# Patient Record
Sex: MALE | Race: WHITE | NOT HISPANIC OR LATINO | Employment: FULL TIME | URBAN - METROPOLITAN AREA
[De-identification: names, ages, dates, MRNs, and addresses within clinical notes are randomized per-mention and may not be internally consistent; named-entity substitution may affect disease eponyms.]

---

## 2017-01-25 ENCOUNTER — OFFICE VISIT (OUTPATIENT)
Dept: FAMILY MEDICINE | Facility: CLINIC | Age: 27
End: 2017-01-25
Payer: COMMERCIAL

## 2017-01-25 VITALS
TEMPERATURE: 96.9 F | HEIGHT: 69 IN | SYSTOLIC BLOOD PRESSURE: 126 MMHG | BODY MASS INDEX: 40.88 KG/M2 | HEART RATE: 80 BPM | WEIGHT: 276 LBS | OXYGEN SATURATION: 98 % | DIASTOLIC BLOOD PRESSURE: 86 MMHG

## 2017-01-25 DIAGNOSIS — Z72.0 TOBACCO ABUSE: ICD-10-CM

## 2017-01-25 DIAGNOSIS — J04.0 LARYNGITIS: Primary | ICD-10-CM

## 2017-01-25 PROCEDURE — 99213 OFFICE O/P EST LOW 20 MIN: CPT | Performed by: NURSE PRACTITIONER

## 2017-01-25 RX ORDER — PREDNISONE 20 MG/1
60 TABLET ORAL DAILY
Qty: 15 TABLET | Refills: 0 | Status: SHIPPED | OUTPATIENT
Start: 2017-01-25 | End: 2018-04-30

## 2017-01-25 NOTE — MR AVS SNAPSHOT
"              After Visit Summary   2017    Rene Scruggs    MRN: [id number]           Patient Information     Date Of Birth          1990        Visit Information        Provider Department      2017 3:00 PM Jessica Ambrocio APRN CNP [facility]        Today's Diagnoses     Laryngitis    -  1        Follow-ups after your visit        Who to contact     If you have questions or need follow up information about today's clinic visit or your schedule please contact [facility] directly at [phone].  Normal or non-critical lab and imaging results will be communicated to you by [employer], letter or phone within 4 business days after the clinic has received the results. If you do not hear from us within 7 days, please contact the clinic through [employer] or phone. If you have a critical or abnormal lab result, we will notify you by phone as soon as possible.  Submit refill requests through [employer] or call your pharmacy and they will forward the refill request to us. Please allow 3 business days for your refill to be completed.          Additional Information About Your Visit        MyChart Information     Net[employer]es lets you send messages to your doctor, view your test results, renew your prescriptions, schedule appointments and more. To sign up, go to [email or web address] . Click on \"Log in\" on the left side of the screen, which will take you to the Welcome page. Then click on \"Sign up Now\" on the right side of the page.     You will be asked to enter the access code listed below, as well as some personal information. Please follow the directions to create your username and password.     Your access code is: [email or web address]  Expires: 2017  3:29 PM     Your access code will  in 90 days. If you need help or a new code, please call your [facility] or [phone].        Care EveryWhere ID     This is your Care EveryWhere ID. This could be used by other " "organizations to access your [facility] medical records  [id number]        Your Vitals Were     Pulse Temperature Height BMI (Body Mass Index) Pulse Oximetry       80 96.9  F (36.1  C) (Tympanic) 5' 8.8\" (1.748 m) 40.97 kg/m2 98%        Blood Pressure from Last 3 Encounters:   01/25/17 126/86   02/11/16 124/100   01/29/16 168/98    Weight from Last 3 Encounters:   01/25/17 276 lb (125.193 kg)   02/11/16 249 lb 1.6 oz (112.991 kg)   01/29/16 259 lb 1.6 oz (117.527 kg)              Today, you had the following     No orders found for display         Today's Medication Changes          These changes are accurate as of: 1/25/17  3:29 PM.  If you have any questions, ask your nurse or doctor.               Start taking these medicines.        Dose/Directions    predniSONE 20 MG tablet   Commonly known as:  DELTASONE   Used for:  Laryngitis   Started by:  Jessica Ambrocio APRN CNP        Dose:  60 mg   Take 3 tablets (60 mg) by mouth daily   Quantity:  15 tablet   Refills:  0            Where to get your medicines      These medications were sent to [facility] [place], [place] - [address]   [address] , [facility] [address]     Phone:  [phone]    - predniSONE 20 MG tablet             Primary Care Provider Office Phone # Fax #    Schuyler Gresham [phone]       [facility] [address] [facility] [address]        Thank you!     Thank you for choosing [facility]  for your care. Our goal is always to provide you with excellent care. Hearing back from our patients is one way we can continue to improve our services. Please take a few minutes to complete the written survey that you may receive in the mail after your visit with us. Thank you!             Your Updated Medication List - Protect others around you: Learn how to safely use, store and throw away your medicines at www.disposemymeds.org.          This list is accurate as of: 1/25/17  3:29 PM.  " Always use your most recent med list.                   Brand Name Dispense Instructions for use    predniSONE 20 MG tablet    DELTASONE    15 tablet    Take 3 tablets (60 mg) by mouth daily

## 2017-01-25 NOTE — PROGRESS NOTES
"  SUBJECTIVE:                                                    Rene Scruggs is a 26 year old male who presents to clinic today for the following health issues:      Concern - hoarseness     Onset: couple months     Description:   Hoarseness in voice    Intensity: mild    Progression of Symptoms:  same    Accompanying Signs & Symptoms:  none       Previous history of similar problem:   none    Precipitating factors:   Worsened by: speaking loudly at work, over the noise of machines    Alleviating factors:  Improved by: none       Therapies Tried and outcome: none      The patient is a 26-year-old male seen in clinic today because of a hoarse voice. This has persisted for 1 or 2 months.  He thinks it has gotten a little bit better. 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 He states he has to talk over the noise of the machines. He also is a cigarette smoker.  He denies throat pain  He denies significant cough.    Problem list and histories reviewed & adjusted, as indicated.  Additional history: as documented    BP Readings from Last 3 Encounters:   01/25/17 126/86   02/11/16 124/100   01/29/16 168/98    Wt Readings from Last 3 Encounters:   01/25/17 276 lb (125.193 kg)   02/11/16 249 lb 1.6 oz (112.991 kg)   01/29/16 259 lb 1.6 oz (117.527 kg)                    ROS:  Constitutional, HEENT, cardiovascular, pulmonary, gi and gu systems are negative, except as otherwise noted.    OBJECTIVE:                                                    /86 mmHg  Pulse 80  Temp(Src) 96.9  F (36.1  C) (Tympanic)  Ht 5' 8.8\" (1.748 m)  Wt 276 lb (125.193 kg)  BMI 40.97 kg/m2  SpO2 98%  Body mass index is 40.97 kg/(m^2).  GENERAL: healthy, alert and no distress  EYES: Eyes grossly normal to inspection, PERRL and conjunctivae and sclerae normal  HENT: ear canals and TM's normal, nose and mouth without ulcers or lesions  NECK: no adenopathy, no asymmetry, masses, or scars and thyroid " normal to palpation  RESP: lungs clear to auscultation - no rales, rhonchi or wheezes  CV: regular rates and rhythm, normal S1 S2, no S3 or S4 and no murmur, click or rub         ASSESSMENT/PLAN:                                                      Problem List Items Addressed This Visit     None      Visit Diagnoses     Laryngitis    -  Primary     Relevant Medications     predniSONE (DELTASONE) 20 MG tablet     Tobacco abuse         Relevant Medications     nicotine polacrilex (EQ NICOTINE) 4 MG gum          Discussed the viral nature of laryngitis. Fact that voice rest is what is more most beneficial. There is some concern regarding his cigarette smoking history  Prednisone 60 mg daily for 5 days  Avoid talking as much as possible  Increase humidity in the home  Avoid smoking  Follow up in clinic in 2 weeks. If no improvement is noted, may consider referral to ENT for further evaluation    RAMSES Dyson [facility]

## 2017-01-25 NOTE — NURSING NOTE
"Chief Complaint   Patient presents with     URI       Initial /86 mmHg  Pulse 80  Temp(Src) 96.9  F (36.1  C) (Tympanic)  Ht 5' 8.8\" (1.748 m)  Wt 276 lb (125.193 kg)  BMI 40.97 kg/m2  SpO2 98% Estimated body mass index is 40.97 kg/(m^2) as calculated from the following:    Height as of this encounter: 5' 8.8\" (1.748 m).    Weight as of this encounter: 276 lb (125.193 kg).  BP completed using cuff size: large  "

## 2018-04-30 ENCOUNTER — OFFICE VISIT (OUTPATIENT)
Dept: URGENT CARE | Facility: RETAIL CLINIC | Age: 28
End: 2018-04-30
Payer: COMMERCIAL

## 2018-04-30 VITALS
DIASTOLIC BLOOD PRESSURE: 90 MMHG | HEART RATE: 113 BPM | SYSTOLIC BLOOD PRESSURE: 148 MMHG | TEMPERATURE: 99.4 F | OXYGEN SATURATION: 96 %

## 2018-04-30 DIAGNOSIS — J02.0 STREP THROAT: ICD-10-CM

## 2018-04-30 DIAGNOSIS — J02.9 ACUTE PHARYNGITIS, UNSPECIFIED ETIOLOGY: Primary | ICD-10-CM

## 2018-04-30 LAB — S PYO AG THROAT QL IA.RAPID: ABNORMAL

## 2018-04-30 PROCEDURE — 99213 OFFICE O/P EST LOW 20 MIN: CPT | Performed by: NURSE PRACTITIONER

## 2018-04-30 PROCEDURE — 87880 STREP A ASSAY W/OPTIC: CPT | Mod: QW | Performed by: NURSE PRACTITIONER

## 2018-04-30 RX ORDER — PENICILLIN V POTASSIUM 500 MG/1
500 TABLET, FILM COATED ORAL 3 TIMES DAILY
Qty: 30 TABLET | Refills: 0 | Status: SHIPPED | OUTPATIENT
Start: 2018-04-30 | End: 2018-05-10

## 2018-04-30 NOTE — MR AVS SNAPSHOT
"              After Visit Summary   2018    Rene Scruggs    MRN: [id number]           Patient Information     Date Of Birth          1990        Visit Information        Provider Department      2018 1:30 PM Song Hernández APRN [facility]        Today's Diagnoses     Acute pharyngitis, unspecified etiology    -  1    Strep throat           Follow-ups after your visit        Who to contact     You can reach your care team any time of the day by calling [phone].  Notification of test results:  If you have an abnormal lab result, we will notify you by phone as soon as possible.         Additional Information About Your Visit        MyChart Information     Medic Trace lets you send messages to your doctor, view your test results, renew your prescriptions, schedule appointments and more. To sign up, go to [email or web address] . Click on \"Log in\" on the left side of the screen, which will take you to the Welcome page. Then click on \"Sign up Now\" on the right side of the page.     You will be asked to enter the access code listed below, as well as some personal information. Please follow the directions to create your username and password.     Your access code is: [email or web address]  Expires: 2018  1:48 PM     Your access code will  in 90 days. If you need help or a new code, please call your [facility] clinic or [phone].        Care EveryWhere ID     This is your [facility] EveryWhere ID. This could be used by other organizations to access your [facility] medical records  [id number]        Your Vitals Were     Pulse Temperature Pulse Oximetry             113 99.4  F (37.4  C) (Oral) 96%          Blood Pressure from Last 3 Encounters:   18 148/90   17 126/86   16 (!) 124/100    Weight from Last 3 Encounters:   17 276 lb (125.2 kg)   16 249 lb 1.6 oz (113 kg)   16 259 lb 1.6 oz (117.5 kg)              We Performed the Following     RAPID " STREP SCREEN          Today's Medication Changes          These changes are accurate as of 4/30/18  1:48 PM.  If you have any questions, ask your nurse or doctor.               Start taking these medicines.        Dose/Directions    penicillin V potassium 500 MG tablet   Commonly known as:  VEETID   Used for:  Strep throat        Dose:  500 mg   Take 1 tablet (500 mg) by mouth 3 times daily for 10 days   Quantity:  30 tablet   Refills:  0            Where to get your medicines      These medications were sent to [address]  [address], [facility] [address]     Phone:  [phone]     penicillin V potassium 500 MG tablet                Primary Care Provider Office Phone # Fax #    Schuyler Gresham [phone]       [address] [facility] [address]        Equal Access to Services     MELANIA GOODMAN : Eliseo alfordo Sojosue, waaxda luqadaha, qaybta kaalmada adeegyada, dennis matt . So [facility] [phone].    ATENCIÓN: Si habla español, tiene a jerry disposición servicios gratuitos de asistencia lingüística. Llame al [phone].    We comply with applicable federal civil rights laws and [place] laws. We do not discriminate on the basis of race, color, national origin, age, disability, sex, sexual orientation, or gender identity.            Thank you!     Thank you for choosing [facility]  for your care. Our goal is always to provide you with excellent care. Hearing back from our patients is one way we can continue to improve our services. Please take a few minutes to complete the written survey that you may receive in the mail after your visit with us. Thank you!             Your Updated Medication List - Protect others around you: Learn how to safely use, store and throw away your medicines at www.disposemymeds.org.          This list is accurate as of 4/30/18  1:48 PM.  Always use your most recent med list.                    Brand Name Dispense Instructions for use Diagnosis    nicotine polacrilex 4 MG gum    EQ NICOTINE    270 tablet    Place 1 each (4 mg) inside cheek as needed for smoking cessation    Tobacco abuse       penicillin V potassium 500 MG tablet    VEETID    30 tablet    Take 1 tablet (500 mg) by mouth 3 times daily for 10 days    Strep throat

## 2018-04-30 NOTE — NURSING NOTE
"Chief Complaint   Patient presents with     Fever     x a week     Pharyngitis     Nasal Congestion       Initial /90  Pulse 113  Temp 99.4  F (37.4  C) (Oral)  SpO2 96% Estimated body mass index is 41 kg/(m^2) as calculated from the following:    Height as of 1/25/17: 5' 8.8\" (1.748 m).    Weight as of 1/25/17: 276 lb (125.2 kg).  Medication Reconciliation: complete   Christy Guevara      "

## 2018-04-30 NOTE — PROGRESS NOTES
[facility] Care clinic note    SUBJECTIVE:  Rene Scruggs is a 27 year old male who presents to [facility] with chief complaint of sore throat.    Onset of symptoms was 4 day(s) ago.    Course of illness: gradual onset and worsening.    Severity mild and moderate  Course of illness:  Current and Associated symptoms: fever, chills, sweats, stuffy nose, cough - non-productive, sore throat, body aches and fatigue  Treatment measures tried at home include DayQuil.  Predisposing factors include None.    Current Outpatient Prescriptions   Medication     nicotine polacrilex (EQ NICOTINE) 4 MG gum     No current facility-administered medications for this visit.      PAST MEDICAL HISTORY: No past medical history on file.    PAST SURGICAL HISTORY: No past surgical history on file.    FAMILY HISTORY: No family history on file.    SOCIAL HISTORY:   Social History   Substance Use Topics     Smoking status: Current Every Day Smoker     Packs/day: 0.50     Smokeless tobacco: Never Used     Alcohol use 0.0 oz/week     0 Standard drinks or equivalent per week      Comment: occasional        ROS:  Review of systems negative except as stated above.    OBJECTIVE:   Vitals:    04/30/18 1330   BP: 148/90   Pulse: 113   Temp: 99.4  F (37.4  C)   TempSrc: Oral   SpO2: 96%     GENERAL APPEARANCE: alert, moderate distress, cooperative and over weight  EYES: EOMI,  PERRL, conjunctiva clear  HENT: ear canals and TM's normal.  Nose normal.  Pharynx erythematous with some exudate noted.  NECK: supple, non-tender to palpation, no adenopathy noted  RESP: lungs clear to auscultation - no rales, rhonchi or wheezes  CV: regular rates and rhythm, normal S1 S2, no murmur noted  ABDOMEN:  soft, nontender, no HSM or masses and bowel sounds normal  SKIN: no suspicious lesions or rashes    Rapid Strep test is positive    ASSESSMENT:   Acute pharyngitis, unspecified etiology  Strep throat      PLAN:   Outpatient  Encounter Prescriptions as of 4/30/2018   Medication Sig Dispense Refill     penicillin V potassium (VEETID) 500 MG tablet Take 1 tablet (500 mg) by mouth 3 times daily for 10 days 30 tablet 0     nicotine polacrilex (EQ NICOTINE) 4 MG gum Place 1 each (4 mg) inside cheek as needed for smoking cessation (Patient not taking: Reported on 4/30/2018) 270 tablet 1     [DISCONTINUED] predniSONE (DELTASONE) 20 MG tablet Take 3 tablets (60 mg) by mouth daily 15 tablet 0     No facility-administered encounter medications on file as of 4/30/2018.      If not improving Follow up at:  [facility] [phone]  Encourage good hydration (mainly water), may drink tea /c honey, warm chicken broth to sooth throat.  Soft foods may be preferred for several days.  Symptomatic treatment with warm Na+ H2O gargles, and OTC meds as needed.   Will be contagious for 24 hours after starting antibiotic & should stay out of public settings.  The goal to minimize exposure to other people.  When given antibiotics follow the full treatment your health care provider recommends. (Finish medications even if feeling better).  Toothbrush should be replaced after 24 hours of being on antibiotic.  Also, wash anything that your mouth has been in contact with recently (water & coffee cups, etc.)    Rest as needed.  Follow-up with primary care provider if not improving or continues to have temps, greater than 48 hours after starting antibiotics.    If difficulty breathing or swallowing be seen in the ED immediately.    Song Hernández MSN, APRN, Family NP-C  [facility]

## 2018-04-30 NOTE — LETTER
[facility]  [address]  [facility] [address]  Phone: [phone]    April 30, 2018        Rene Scruggs  [id number] [facility] [address]          To whom it may concern:    RE: Rene Scruggs    Patient was seen and treated today at our clinic and missed work.  He needs to be treated for 24 hours before returning to work.    Please contact me for questions or concerns.      Sincerely,        RAMSES Mc CNP

## 2019-02-13 ENCOUNTER — OFFICE VISIT (OUTPATIENT)
Dept: FAMILY MEDICINE | Facility: CLINIC | Age: 29
End: 2019-02-13
Payer: COMMERCIAL

## 2019-02-13 VITALS
TEMPERATURE: 98.1 F | HEIGHT: 70 IN | HEART RATE: 119 BPM | OXYGEN SATURATION: 97 % | BODY MASS INDEX: 45.1 KG/M2 | RESPIRATION RATE: 17 BRPM | DIASTOLIC BLOOD PRESSURE: 98 MMHG | SYSTOLIC BLOOD PRESSURE: 160 MMHG | WEIGHT: 315 LBS

## 2019-02-13 DIAGNOSIS — E66.01 MORBID OBESITY (H): ICD-10-CM

## 2019-02-13 DIAGNOSIS — G47.33 OSA (OBSTRUCTIVE SLEEP APNEA): ICD-10-CM

## 2019-02-13 DIAGNOSIS — I10 BENIGN ESSENTIAL HYPERTENSION: Primary | ICD-10-CM

## 2019-02-13 PROCEDURE — 99214 OFFICE O/P EST MOD 30 MIN: CPT | Performed by: OBSTETRICS & GYNECOLOGY

## 2019-02-13 RX ORDER — LISINOPRIL 5 MG/1
5 TABLET ORAL DAILY
Qty: 90 TABLET | Refills: 3 | Status: SHIPPED | OUTPATIENT
Start: 2019-02-13 | End: 2021-01-18

## 2019-02-13 SDOH — HEALTH STABILITY: MENTAL HEALTH: HOW OFTEN DO YOU HAVE A DRINK CONTAINING ALCOHOL?: MONTHLY OR LESS

## 2019-02-13 ASSESSMENT — MIFFLIN-ST. JEOR: SCORE: 2389.69

## 2019-02-13 ASSESSMENT — PAIN SCALES - GENERAL: PAINLEVEL: NO PAIN (0)

## 2019-02-13 NOTE — PROGRESS NOTES
Subjective:   He is here with his significant other who complains that he stops breathing at night.  He snores.  Sometimes pauses breathing for 30 seconds or so.  He wants to get checked for obstructive sleep apnea.  Also he is morbidly obese and wants to lose weight.  Has been told he has elevated blood pressure readings in the past.  No headaches.      The past medical history, social history, past surgical history and family history as shown below have been reviewed by me today.  No past medical history on file.   No Known Allergies  Current Outpatient Medications   Medication Sig Dispense Refill     lisinopril (PRINIVIL/ZESTRIL) 5 MG tablet Take 1 tablet (5 mg) by mouth daily 90 tablet 3     No past surgical history on file.  Social History     Socioeconomic History     Marital status: Single     Spouse name: None     Number of children: None     Years of education: None     Highest education level: None   Social Needs     Financial resource strain: None     Food insecurity - worry: None     Food insecurity - inability: None     Transportation needs - medical: None     Transportation needs - non-medical: None   Occupational History     None   Tobacco Use     Smoking status: Former Smoker     Packs/day: 0.50     Last attempt to quit: 2018     Years since quittin.0     Smokeless tobacco: Never Used   Substance and Sexual Activity     Alcohol use: Yes     Alcohol/week: 0.0 oz     Frequency: Monthly or less     Comment: occasional      Drug use: No     Sexual activity: Yes     Partners: Female   Other Topics Concern     Parent/sibling w/ CABG, MI or angioplasty before 65F 55M? No   Social History Narrative     None     No family history on file.    ROS: A 12 point review of systems was done. Except for what is listed above in the HPI, the systems review is negative .      Objective: Vital signs: Blood pressure (!) 160/98, pulse 119, temperature 98.1  F (36.7  C), temperature source Tympanic, resp. rate 17,  "height 1.765 m (5' 9.5\"), weight 143.1 kg (315 lb 9 oz), SpO2 97 %.    Blood pressure recheck is 148/97.  Fundi are benign- disc margins are sharp. No papilledema noted.    HEENT:    Sclerae and conjunctiva are normal.   Ear canals and TMs look normal.  Nasal mucosa is pink  - no polyps or masses seen.  sinuses are non tender to palpation.  Throat is unremarkable . Mucous membranes are moist.   Neck is supple, mobile, no adenopathy or masses palpable. The thyroid feels normal.   Normal range of motion noted.  Chest is clear to auscultation.  No wheezes, rales or rhonchi heard.  Cardiac exam is normal with s1, s2, no murmurs or adventitious sounds.Normal rate and rhythm is heard.           Assessment/Plan:    1.  History suggest obstructive sleep apnea-we will refer to Dr. Spear.    2.  Benign essential hypertension-see Rx for lisinopril-The potential side effects and risks of the medication were thoroughly discussed with the patient.  We discussed possibility of cough.  Stop if this develops.  Recheck in 2 weeks here and I will consider drawing a basic metabolic panel at that time    3.  Morbid obesity-I advised walking 5 miles a day.  He needs to lose weight.        LARS Arguello MD              "

## 2021-01-13 NOTE — PROGRESS NOTES
"    Viktor López is a 30 year old who presents to clinic today for the following health issues     HPI       Back problem- feels pressure x 1 week  Previous injury 2 years ago  Had back surgery 2016    Patient presents today for evaluation of low back pain. He has history of surgery in 2016. MRI at that time showed a large disc protrusion at L2-3 with severe central stenosis affecting the L3 nerve roots. Patient reports he had been feeling really well since the surgery. He reports over the last week he has noticed an increased pressure sensation in his low back in the same area of his surgery - feels like right after he had the surgery done. He also reports right leg feels more numb or different than it had been. He is concerned something may be triggering this. Would like to see a chiropractor potentially but worried this could cause more problems. 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 No issue doing this but does feel something \"catch\" at times. Denies any change in bowel or bladder function.     Review of Systems   Constitutional, HEENT, cardiovascular, pulmonary, gi and gu systems are negative, except as otherwise noted.      Objective    /60   Pulse 80   Temp 96.3  F (35.7  C) (Temporal)   Resp 14   Ht 1.791 m (5' 10.5\")   Wt 98 kg (216 lb)   BMI 30.55 kg/m    Body mass index is 30.55 kg/m .  Physical Exam   GENERAL: healthy, alert and no distress  RESP: lungs clear to auscultation - no rales, rhonchi or wheezes  CV: regular rate and rhythm, normal S1 S2, no S3 or S4, no murmur, click or rub, no peripheral edema and peripheral pulses strong  MS: Patient with guarding and mild tenderness over the L2-5 area. Full ROM with flexion and extension. Negative straight leg raise bilaterally.  No calf tenderness. Distal pulses intact.   SKIN: no suspicious lesions or rashes  NEURO: Normal strength and tone, mentation intact and speech normal  PSYCH: mentation appears " normal, affect normal/bright      Assessment & Plan     Herniated lumbar intervertebral disc  Patient with history of herniated dis and surgical intervention. He has noticed a change in his symptoms recently enough that it has been concerning to him. He considered seeing a chiropractor but doesn't want to until he is sure there is not a larger issue occurring - this is quesada. Will place orders to update MRI - last from 2016 prior to surgery. Continue supportive cares at this time. Follow-up pending progression of symptoms and MRI report. Red flag symptoms that should prompt immediate care in the ER discussed.   - MR Lumbar Spine w/o & w Contrast; Future    H/O lumbosacral spine surgery  - MR Lumbar Spine w/o & w Contrast; Future    Need for tetanus booster  - TDAP VACCINE (Adacel, Boostrix)  [[id number]]    The patient indicates understanding of these issues and agrees with the plan.    NORMAN Liu [facility]

## 2021-01-15 ENCOUNTER — HEALTH MAINTENANCE LETTER (OUTPATIENT)
Age: 31
End: 2021-01-15

## 2021-01-18 ENCOUNTER — OFFICE VISIT (OUTPATIENT)
Dept: FAMILY MEDICINE | Facility: CLINIC | Age: 31
End: 2021-01-18
Payer: COMMERCIAL

## 2021-01-18 VITALS
DIASTOLIC BLOOD PRESSURE: 60 MMHG | BODY MASS INDEX: 30.24 KG/M2 | TEMPERATURE: 96.3 F | SYSTOLIC BLOOD PRESSURE: 112 MMHG | WEIGHT: 216 LBS | RESPIRATION RATE: 14 BRPM | HEIGHT: 71 IN | HEART RATE: 80 BPM

## 2021-01-18 DIAGNOSIS — Z23 NEED FOR TETANUS BOOSTER: ICD-10-CM

## 2021-01-18 DIAGNOSIS — M51.26 HERNIATED LUMBAR INTERVERTEBRAL DISC: Primary | ICD-10-CM

## 2021-01-18 DIAGNOSIS — Z98.890 H/O LUMBOSACRAL SPINE SURGERY: ICD-10-CM

## 2021-01-18 PROBLEM — E66.811 CLASS 1 OBESITY WITHOUT SERIOUS COMORBIDITY WITH BODY MASS INDEX (BMI) OF 30.0 TO 30.9 IN ADULT: Status: ACTIVE | Noted: 2019-02-13

## 2021-01-18 PROCEDURE — 90471 IMMUNIZATION ADMIN: CPT | Performed by: PHYSICIAN ASSISTANT

## 2021-01-18 PROCEDURE — 90715 TDAP VACCINE 7 YRS/> IM: CPT | Performed by: PHYSICIAN ASSISTANT

## 2021-01-18 PROCEDURE — 99213 OFFICE O/P EST LOW 20 MIN: CPT | Mod: 25 | Performed by: PHYSICIAN ASSISTANT

## 2021-01-18 ASSESSMENT — MIFFLIN-ST. JEOR: SCORE: 1943.96

## 2021-01-20 ENCOUNTER — HOSPITAL ENCOUNTER (OUTPATIENT)
Dept: MRI IMAGING | Facility: CLINIC | Age: 31
Discharge: HOME | End: 2021-01-20
Attending: PHYSICIAN ASSISTANT | Admitting: PHYSICIAN ASSISTANT
Payer: COMMERCIAL

## 2021-01-20 DIAGNOSIS — Z98.890 H/O LUMBOSACRAL SPINE SURGERY: ICD-10-CM

## 2021-01-20 DIAGNOSIS — M51.26 HERNIATED LUMBAR INTERVERTEBRAL DISC: ICD-10-CM

## 2021-01-20 PROCEDURE — 72158 MRI LUMBAR SPINE W/O & W/DYE: CPT

## 2021-01-20 PROCEDURE — 255N000002 HC RX 255 OP 636: Performed by: PHYSICIAN ASSISTANT

## 2021-01-20 PROCEDURE — A9585 GADOBUTROL INJECTION: HCPCS | Performed by: PHYSICIAN ASSISTANT

## 2021-01-20 RX ORDER — GADOBUTROL 604.72 MG/ML
10 INJECTION INTRAVENOUS ONCE
Status: COMPLETED | OUTPATIENT
Start: 2021-01-20 | End: 2021-01-20

## 2021-01-20 RX ADMIN — GADOBUTROL 10 ML: 604.72 INJECTION INTRAVENOUS at 16:50

## 2021-01-21 NOTE — RESULT ENCOUNTER NOTE
Please notify patient that his MRI shows mild-moderate disc osteophytes - this is unchanged from previous his previous MRI. He also has mild-moderate spinal canal stenosis in L2-3 but this is better compared to his previous MRI. Would encouraged monitoring of his symptoms. Certainly can do chiropractic or PT if he would like.    Deidra Smallwood PA-C

## 2021-04-16 ENCOUNTER — E-VISIT (OUTPATIENT)
Dept: URGENT CARE | Facility: URGENT CARE | Age: 31
End: 2021-04-16
Payer: COMMERCIAL

## 2021-04-16 DIAGNOSIS — Z20.822 SUSPECTED COVID-19 VIRUS INFECTION: Primary | ICD-10-CM

## 2021-04-16 PROCEDURE — 99421 OL DIG E/M SVC 5-10 MIN: CPT | Performed by: FAMILY MEDICINE

## 2021-04-16 NOTE — PATIENT INSTRUCTIONS
Dear Rene Scruggs,    Your symptoms show that you may have coronavirus (COVID-19). This illness can cause fever, cough and trouble breathing. Many people get a mild case and get better on their own. Some people can get very sick.    Will I be tested for COVID-19?  We would like to test you for Covid-19 virus. I have placed orders for this test.     To schedule: go to your [facility] home page and scroll down to the section that says  You have an appointment that needs to be scheduled  and click the large green button that says  Schedule Now  and follow the steps to find the next available openings.    If you are unable to complete these [facility] scheduling steps, please call [phone] to schedule your testing.     Return to work/school/ guidance:  Please let your workplace manager and staffing office know when your quarantine ends     We can t give you an exact date as it depends on the above. You can calculate this on your own or work with your manager/staffing office to calculate this. (For example if you were exposed on 10/4, you would have to quarantine for 14 full days. That would be through 10/18. You could return on 10/19.)      If you receive a positive COVID-19 test result, follow the guidance of the those who are giving you the results. Usually the return to work is 10 (or in some cases 20 days from symptom onset.) If you work at [facility], you must also be cleared by Employee Occupational Health and Safety to return to work.        If you receive a negative COVID-19 test result and did not have a high risk exposure to someone with a known positive COVID-19 test, you can return to work once you're free of fever for 24 hours without fever-reducing medication and your symptoms are improving or resolved.      If you receive a negative COVID-19 test and If you had a high risk exposure to someone who has tested positive for COVID-19 then you can return to work 14 days after your last  contact with the positive individual    Note: If you have ongoing exposure to the covid positive person, this quarantine period may be more than 14 days. (For example, if you are continued to be exposed to your child who tested positive and cannot isolate from them, then the quarantine of 7-14 days can't start until your child is no longer contagious. This is typically 10 days from onset of the child's symptoms. So the total duration may be 17-24 days in this case.)    Sign up for [employer].   We know it's scary to hear that you might have COVID-19. We want to track your symptoms to make sure you're okay over the next 2 weeks. Please look for an email from [employer]--this is a free, online program that we'll use to keep in touch. To sign up, follow the link in the email you will receive. Learn more at [email or web address]    How can I take care of myself?    Get lots of rest. Drink extra fluids (unless a doctor has told you not to)    Take Tylenol (acetaminophen) or ibuprofen for fever or pain. If you have liver or kidney problems, ask your family doctor if it's okay to take Tylenol o ibuprofen    If you have other health problems (like cancer, heart failure, an organ transplant or severe kidney disease): Call your specialty clinic if you don't feel better in the next 2 days.    Know when to call 911. Emergency warning signs include:  o Trouble breathing or shortness of breath  o Pain or pressure in the chest that doesn't go away  o Feeling confused like you haven't felt before, or not being able to wake up  o Bluish-colored lips or face    Where can I get more information?  [facility] - About COVID-19:   [email or web address]    CDC - What to Do If You're Sick:   www.cdc.gov/coronavirus/2019-ncov/about/steps-when-sick.html    April 16, 2021  RE:  Rene Scruggs                                                                                                                   [address] [place] [id number]      To whom it may concern:    I evaluated Rene Scruggs on April 16, 2021. Rene Scruggs should be excused from work/school.     They should let their workplace manager and staffing office know when their quarantine ends.    We can not give an exact date as it depends on the information below. They can calculate this on their own or work with their manager/staffing office to calculate this. (For example if they were exposed on 10/04, they would have to quarantine for 14 full days. That would be through 10/18. They could return on 10/19.)    Quarantine Guidelines:      If patient receives a positive COVID-19 test result, they should follow the guidance of those who are giving the results. Usually the return to work is 10 (or in some cases 20 days from symptom onset.) If they work at [employer], they must be cleared by Employee Occupational Health and Safety to return to work.        If patient receives a negative COVID-19 test result and did not have a high risk exposure to someone with a known positive COVID-19 test, they can return to work once they're free of fever for 24 hours without fever-reducing medication and their symptoms are improving or resolved.      If patient receives a negative COVID-19 test and if they had a high risk exposure to someone who has tested positive for COVID-19 then they can return to work 14 days after their last contact with the positive individual    Note: If there is ongoing exposure to the covid positive person, this quarantine period may be longer than 14 days. (For example, if they are continually exposed to their child, who tested positive and cannot isolate from them, then the quarantine of 7-14 days can't start until their child is no longer contagious. This is typically 10 days from onset to the child's symptoms. So the total duration may be 17-24 days in this case.)     Sincerely,  Kwesi Mora, DO

## 2021-04-18 DIAGNOSIS — Z20.822 SUSPECTED COVID-19 VIRUS INFECTION: ICD-10-CM

## 2021-04-18 PROCEDURE — U0005 INFEC AGEN DETEC AMPLI PROBE: HCPCS | Performed by: FAMILY MEDICINE

## 2021-04-18 PROCEDURE — U0003 INFECTIOUS AGENT DETECTION BY NUCLEIC ACID (DNA OR RNA); SEVERE ACUTE RESPIRATORY SYNDROME CORONAVIRUS 2 (SARS-COV-2) (CORONAVIRUS DISEASE [COVID-19]), AMPLIFIED PROBE TECHNIQUE, MAKING USE OF HIGH THROUGHPUT TECHNOLOGIES AS DESCRIBED BY CMS-2020-01-R: HCPCS | Performed by: FAMILY MEDICINE

## 2021-04-19 LAB
LABORATORY COMMENT REPORT: NORMAL
SARS-COV-2 RNA RESP QL NAA+PROBE: NEGATIVE
SARS-COV-2 RNA RESP QL NAA+PROBE: NORMAL
SPECIMEN SOURCE: NORMAL
SPECIMEN SOURCE: NORMAL

## 2021-06-07 ENCOUNTER — TELEPHONE (OUTPATIENT)
Dept: FAMILY MEDICINE | Facility: CLINIC | Age: 31
End: 2021-06-07

## 2021-06-07 ENCOUNTER — E-VISIT (OUTPATIENT)
Dept: FAMILY MEDICINE | Facility: CLINIC | Age: 31
End: 2021-06-07
Payer: COMMERCIAL

## 2021-06-07 DIAGNOSIS — R22.9 LOCALIZED SUPERFICIAL SWELLING, MASS, OR LUMP: Primary | ICD-10-CM

## 2021-06-07 PROCEDURE — 99207 PR NON-BILLABLE SERV PER CHARTING: CPT | Performed by: FAMILY MEDICINE

## 2021-06-07 NOTE — TELEPHONE ENCOUNTER
Patient did have Evisit today with PCP and told to schedule in clinic appointment.     Attempted to call patient, no answer. Left message for a return call to the clinic when available.     JESUS GastonN, RN  [facility]

## 2021-06-07 NOTE — TELEPHONE ENCOUNTER
Patient scheduled appointment  for 6/10/2021- mass in testicle. Please triage to see if appropriate to wait.   Thank you  Shauna Milligan MA on 6/7/2021 at 12:49 PM

## 2021-06-07 NOTE — PATIENT INSTRUCTIONS
Thank you for choosing us for your care. I think an in-clinic visit would be best next steps based on your symptoms. Please schedule a clinic appointment; you won t be charged for this eVisit.      You can schedule an appointment right here in [facility], or call [phone]

## 2021-06-08 NOTE — TELEPHONE ENCOUNTER
RN calling to discuss sx to see if OK to wait for appt Thursday.  Left message on identified phone to call back to see if he should be seen sooner than appt.   SANTO Yousif

## 2021-09-12 ENCOUNTER — HEALTH MAINTENANCE LETTER (OUTPATIENT)
Age: 31
End: 2021-09-12

## 2022-02-27 ENCOUNTER — HEALTH MAINTENANCE LETTER (OUTPATIENT)
Age: 32
End: 2022-02-27

## 2022-11-19 ENCOUNTER — HEALTH MAINTENANCE LETTER (OUTPATIENT)
Age: 32
End: 2022-11-19

## 2023-04-09 ENCOUNTER — HEALTH MAINTENANCE LETTER (OUTPATIENT)
Age: 33
End: 2023-04-09

## 2023-06-03 ENCOUNTER — HOSPITAL ENCOUNTER (EMERGENCY)
Facility: CLINIC | Age: 33
Discharge: HOME | End: 2023-06-03
Attending: PHYSICIAN ASSISTANT | Admitting: PHYSICIAN ASSISTANT

## 2023-06-03 VITALS
OXYGEN SATURATION: 99 % | SYSTOLIC BLOOD PRESSURE: 121 MMHG | TEMPERATURE: 98.1 F | DIASTOLIC BLOOD PRESSURE: 75 MMHG | RESPIRATION RATE: 18 BRPM | HEART RATE: 86 BPM

## 2023-06-03 DIAGNOSIS — S05.02XA ABRASION OF LEFT CORNEA, INITIAL ENCOUNTER: ICD-10-CM

## 2023-06-03 PROCEDURE — 99283 EMERGENCY DEPT VISIT LOW MDM: CPT | Performed by: PHYSICIAN ASSISTANT

## 2023-06-03 RX ORDER — TETRACAINE HYDROCHLORIDE 5 MG/ML
1 SOLUTION OPHTHALMIC ONCE
Status: DISCONTINUED | OUTPATIENT
Start: 2023-06-03 | End: 2023-06-04 | Stop reason: HOSPADM

## 2023-06-03 RX ORDER — ERYTHROMYCIN 5 MG/G
0.5 OINTMENT OPHTHALMIC 4 TIMES DAILY
Qty: 3.5 G | Refills: 0 | Status: SHIPPED | OUTPATIENT
Start: 2023-06-03 | End: 2023-06-08

## 2023-06-04 NOTE — ED TRIAGE NOTES
Patient presents with eye pain. Primarily the L eye. Was welding and is concerned for flash burn injury along with possible foreign body.     Triage Assessment     Row Name 06/03/23 5860       Triage Assessment (Adult)    Airway WDL WDL       Respiratory WDL    Respiratory WDL WDL       Skin Circulation/Temperature WDL    Skin Circulation/Temperature WDL WDL       Cardiac WDL    Cardiac WDL WDL       Peripheral/Neurovascular WDL    Peripheral Neurovascular WDL WDL       Cognitive/Neuro/Behavioral WDL    Cognitive/Neuro/Behavioral WDL WDL

## 2023-06-04 NOTE — ED PROVIDER NOTES
History     Chief Complaint   Patient presents with     Eye Pain       HPI  Rene Scruggs is a 33 year old male who comes to the emergency department complaining of eye pain, left worse than right.  A couple hours prior to arrival he was out welding and something flew into his left eye.  He feels like there is something still in it as he is having difficulty opening the eye due to profound irritation.  He flushed the eye with saline for an about an hour after initial injury.  Right eye feels fine.  Otherwise at baseline health.  Patient having difficulty opening left eye to assess vision due to discomfort but no reported visual changes and right at this time.         Allergies:  No Known Allergies    Problem List:    Patient Active Problem List    Diagnosis Date Noted     Class 1 obesity without serious comorbidity with body mass index (BMI) of 30.0 to 30.9 in adult 2019     Priority: Medium     Herniated lumbar intervertebral disc 2016     Priority: Medium     Displacement of lumbar intervertebral disc without myelopathy 2016     Priority: Medium     CARDIOVASCULAR SCREENING; LDL GOAL LESS THAN 160 10/31/2010     Priority: Medium        Past Medical History:    No past medical history on file.    Past Surgical History:    No past surgical history on file.    Family History:    No family history on file.    Social History:  Marital Status:  Single [1]  Social History     Tobacco Use     Smoking status: Former     Packs/day: 0.50     Types: Cigarettes     Quit date: 2018     Years since quittin.3     Smokeless tobacco: Never   Substance Use Topics     Alcohol use: Yes     Alcohol/week: 0.0 standard drinks of alcohol     Comment: occasional      Drug use: No        Medications:    erythromycin (ROMYCIN) 5 MG/GM ophthalmic ointment          Review of Systems   All other systems reviewed and are negative.         Physical Exam   BP: 121/75  Pulse: 86  Temp: 98.1  F (36.7  C)  Resp:  18  SpO2: 99 %      Physical Exam  Vitals and nursing note reviewed.   Constitutional:       General: He is not in acute distress.     Appearance: He is not ill-appearing, toxic-appearing or diaphoretic.   HENT:      Head: Normocephalic and atraumatic.      Nose: Nose normal.   Eyes:      Extraocular Movements: Extraocular movements intact.      Pupils: Pupils are equal, round, and reactive to light.      Comments: Right conjunctiva within normal limits  Left conjunctival injection with tearing.  Lids everted and swept without evidence of foreign body.  Fluorescein exam revealed uptake on the inferior aspect of the cornea with scleral involvement as well.   Pulmonary:      Effort: Pulmonary effort is normal. No respiratory distress.   Musculoskeletal:      Cervical back: Neck supple.   Skin:     General: Skin is warm and dry.   Neurological:      General: No focal deficit present.      Mental Status: He is alert and oriented to person, place, and time. Mental status is at baseline.   Psychiatric:         Mood and Affect: Mood normal.         Behavior: Behavior normal.            ED Course           Procedures      No results found for this or any previous visit (from the past 24 hour(s)).    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  Right eye is asymptomatic.  He did flush the eye copiously for nearly an hour with saline at home prior to arrival.  Here today he had normal vital signs.  Exam revealed left eye conjunctival injection with tearing.  Lids everted and no evidence of foreign bodies.  Fluorescein exam did demonstrate uptake consistent with an abrasion to the lower cornea and  sclera.  It did not appear consistent with the welding flash injury.  Suspect he had a foreign body in the eye that was flushed but did cause this abrasion.  Because of the corneal abrasion I will give him erythromycin ointment for infection prophylaxis to utilize.  Encouraged use of ibuprofen or Tylenol for pain control as well as ice packs to the eye.  If no improvement in a couple days advised to follow-up with an eye doctor.  He was given instructions on when to return to the ED.  All questions answered and patient discharged home in suitable condition.     I have reviewed the nursing notes.    I have reviewed the findings, diagnosis, plan and need for follow up with the patient.      New Prescriptions    ERYTHROMYCIN (ROMYCIN) 5 MG/GM OPHTHALMIC OINTMENT    Place 0.5 inches Into the left eye 4 times daily for 5 days       Final diagnoses:   Abrasion of left cornea, initial encounter     Note: Chart documentation done in part with Dragon Voice Recognition software. Although reviewed after completion, some word and grammatical errors may remain.    6/3/2023   [facility] EMERGENCY DEPT     Ying Good PA-C  06/03/23 1161

## 2023-06-04 NOTE — DISCHARGE INSTRUCTIONS
Please use the antibiotic ointment to prevent infection in the eye.  This will also lubricate the eye and help it heal.  You can take Tylenol or ibuprofen for pain and apply ice packs to the eye.  If no improvement in a couple days, please follow-up with an eye doctor.  Return to the emergency department for any worsening symptoms.

## 2023-12-22 ENCOUNTER — E-VISIT (OUTPATIENT)
Dept: FAMILY MEDICINE | Facility: CLINIC | Age: 33
End: 2023-12-22
Payer: MEDICAID

## 2023-12-22 DIAGNOSIS — M54.16 LUMBAR RADICULOPATHY: Primary | ICD-10-CM

## 2023-12-22 PROCEDURE — 99421 OL DIG E/M SVC 5-10 MIN: CPT | Performed by: PHYSICIAN ASSISTANT

## 2023-12-24 NOTE — PATIENT INSTRUCTIONS
Sidney Jarrett,    Sorry to hear the low back pain has been worsening. I placed orders for an MRI. The number to schedule is [phone]. Please let me know if you have other questions.     Deidra Gonzalez

## 2024-01-25 ENCOUNTER — HOSPITAL ENCOUNTER (OUTPATIENT)
Dept: MRI IMAGING | Facility: CLINIC | Age: 34
Discharge: HOME | End: 2024-01-25
Attending: PHYSICIAN ASSISTANT | Admitting: PHYSICIAN ASSISTANT
Payer: COMMERCIAL

## 2024-01-25 DIAGNOSIS — M54.16 LUMBAR RADICULOPATHY: ICD-10-CM

## 2024-01-25 PROCEDURE — 72148 MRI LUMBAR SPINE W/O DYE: CPT

## 2024-01-26 DIAGNOSIS — M54.16 LUMBAR RADICULOPATHY: Primary | ICD-10-CM

## 2024-06-16 ENCOUNTER — HEALTH MAINTENANCE LETTER (OUTPATIENT)
Age: 34
End: 2024-06-16

## 2024-07-02 ENCOUNTER — TELEPHONE (OUTPATIENT)
Dept: FAMILY MEDICINE | Facility: CLINIC | Age: 34
End: 2024-07-02

## 2024-07-02 NOTE — TELEPHONE ENCOUNTER
Patient Quality Outreach    Patient is due for the following:   Physical Preventive Adult Physical      Topic Date Due    Hepatitis B Vaccine (1 of 3 - 19+ 3-dose series) Never done    COVID-19 Vaccine (1 - 2023-24 season) Never done       Next Steps:   Schedule a Adult Preventative    Type of outreach:    Sent [address] message.      Questions for provider review:    None           Monse Rose, CMA

## 2024-07-02 NOTE — LETTER
August 5, 2024    To  Rene Scruggs  [address] [address] [place] [id number]    Your team at [facility] cares about your health. We have reviewed your chart and based on our findings; we are making the following recommendations to better manage your health.     You are in particular need of attention regarding the following:     PREVENTATIVE VISIT: Physical    If you have already completed these items, please contact the clinic via phone or   MyChart so your care team can review and update your records. Thank you for   choosing [facility] for your healthcare needs. For any questions,   concerns, or to schedule an appointment please contact our clinic.    Healthy Regards,      Your [facility] Care Team

## 2024-08-05 NOTE — TELEPHONE ENCOUNTER
Patient Quality Outreach    Patient is due for the following:   Physical Preventive Adult Physical    Next Steps:   Schedule a Adult Preventative    Type of outreach:    Sent letter.    Next Steps:  Reach out within 90 days via Phone.    Max number of attempts reached: Yes. Will try again in 90 days if patient still on fail list.    Questions for provider review:    None           Monse Rose, CMA

## 2025-06-21 ENCOUNTER — HEALTH MAINTENANCE LETTER (OUTPATIENT)
Age: 35
End: 2025-06-21